# Patient Record
Sex: FEMALE | Race: WHITE | NOT HISPANIC OR LATINO | Employment: UNEMPLOYED | URBAN - METROPOLITAN AREA
[De-identification: names, ages, dates, MRNs, and addresses within clinical notes are randomized per-mention and may not be internally consistent; named-entity substitution may affect disease eponyms.]

---

## 2017-03-31 ENCOUNTER — HOSPITAL ENCOUNTER (EMERGENCY)
Facility: HOSPITAL | Age: 27
Discharge: HOME | End: 2017-03-31
Attending: EMERGENCY MEDICINE

## 2017-03-31 VITALS
HEART RATE: 76 BPM | BODY MASS INDEX: 32.44 KG/M2 | TEMPERATURE: 98 F | OXYGEN SATURATION: 97 % | DIASTOLIC BLOOD PRESSURE: 69 MMHG | WEIGHT: 190 LBS | RESPIRATION RATE: 16 BRPM | HEIGHT: 64 IN | SYSTOLIC BLOOD PRESSURE: 120 MMHG

## 2017-03-31 DIAGNOSIS — N93.9 VAGINAL BLEEDING: Primary | ICD-10-CM

## 2017-03-31 LAB
ABO + RH BLD: NORMAL
B-HCG UR QL: POSITIVE
BACTERIA GENITAL QL WET PREP: ABNORMAL
BASOPHILS # BLD AUTO: 0 K/UL
BASOPHILS NFR BLD: 0.4 %
CLUE CELLS VAG QL WET PREP: ABNORMAL
CTP QC/QA: YES
DIFFERENTIAL METHOD: ABNORMAL
EOSINOPHIL # BLD AUTO: 0.1 K/UL
EOSINOPHIL NFR BLD: 1.1 %
ERYTHROCYTE [DISTWIDTH] IN BLOOD BY AUTOMATED COUNT: 13.1 %
FILAMENT FUNGI VAG WET PREP-#/AREA: ABNORMAL
HCG INTACT+B SERPL-ACNC: NORMAL MIU/ML
HCT VFR BLD AUTO: 40.4 %
HGB BLD-MCNC: 13.8 G/DL
LYMPHOCYTES # BLD AUTO: 1.5 K/UL
LYMPHOCYTES NFR BLD: 13.4 %
MCH RBC QN AUTO: 32.3 PG
MCHC RBC AUTO-ENTMCNC: 34.1 %
MCV RBC AUTO: 95 FL
MONOCYTES # BLD AUTO: 0.5 K/UL
MONOCYTES NFR BLD: 4.3 %
NEUTROPHILS # BLD AUTO: 9 K/UL
NEUTROPHILS NFR BLD: 80.8 %
PLATELET # BLD AUTO: 271 K/UL
PMV BLD AUTO: 8.3 FL
RBC # BLD AUTO: 4.27 M/UL
SPECIMEN SOURCE: ABNORMAL
T VAGINALIS GENITAL QL WET PREP: ABNORMAL
WBC # BLD AUTO: 11.2 K/UL
WBC #/AREA VAG WET PREP: ABNORMAL
YEAST GENITAL QL WET PREP: ABNORMAL

## 2017-03-31 PROCEDURE — 86900 BLOOD TYPING SEROLOGIC ABO: CPT

## 2017-03-31 PROCEDURE — 36415 COLL VENOUS BLD VENIPUNCTURE: CPT

## 2017-03-31 PROCEDURE — 99284 EMERGENCY DEPT VISIT MOD MDM: CPT

## 2017-03-31 PROCEDURE — 84702 CHORIONIC GONADOTROPIN TEST: CPT

## 2017-03-31 PROCEDURE — 86901 BLOOD TYPING SEROLOGIC RH(D): CPT

## 2017-03-31 PROCEDURE — 87210 SMEAR WET MOUNT SALINE/INK: CPT

## 2017-03-31 PROCEDURE — 81025 URINE PREGNANCY TEST: CPT | Performed by: PHYSICIAN ASSISTANT

## 2017-03-31 PROCEDURE — 87591 N.GONORRHOEAE DNA AMP PROB: CPT

## 2017-03-31 PROCEDURE — 85025 COMPLETE CBC W/AUTO DIFF WBC: CPT

## 2017-03-31 NOTE — ED NOTES
"Pt requesting to know, "What we're waiting on?" Pt informed awaiting results of lab tests and urinalysis which has not been provided yet. Pt states, "Well, the doctor said the baby was all right, and I'm tired, and I just want to go home." Pt educated on importance of staying in ED until all test results return. Pt still states she wishes to leave. Pt ambulatory out of ED with SO at this time without distress. States she will return, "If I feel bad later." Pt refused to sign MAGDALENE BENITEZ and PA aware.  "

## 2017-03-31 NOTE — ED NOTES
Urine sample again requested from patient. Pt again states unable to provide urine sample at this time. Pt educated about importance of urine sample. Instructed to alert RN when able to provide sample. Pt verbalized understanding. Pt given PO fluids at this time per request.

## 2017-03-31 NOTE — ED NOTES
Urine sample requested from patient. Pt states unable to provide urine sample at this time. Pt educated about importance of urine sample. Instructed to alert RN when able to provide sample. Pt verbalized understanding.

## 2017-04-01 LAB
C TRACH DNA SPEC QL NAA+PROBE: NOT DETECTED
N GONORRHOEA DNA SPEC QL NAA+PROBE: NOT DETECTED

## 2017-04-01 NOTE — ED PROVIDER NOTES
Encounter Date: 3/31/2017       History     Chief Complaint   Patient presents with    Vaginal Bleeding     7 weeks pregnant     Review of patient's allergies indicates:  No Known Allergies  HPI Comments: Veronica Rothman is a 26 y.o. Female presenting for evaluation of bright red vaginal bleeding and associated lower abdominal cramping, that began this morning.  Patient states she is approximately 7 weeks pregnant.  Her last menstrual cycle was in early February.  She is noticed no abnormal vaginal discharge, aside from the bleeding.  No dysuria or hematuria.  No fever, no chills.  No nausea, vomiting or diarrhea.  She has not seen her OB/GYN for this pregnancy yet.  She is  A0.  She does not know her blood type.  She has not taken any medication for the pain.     The history is provided by the patient.     History reviewed. No pertinent past medical history.  History reviewed. No pertinent surgical history.  History reviewed. No pertinent family history.  Social History   Substance Use Topics    Smoking status: Current Every Day Smoker     Packs/day: 1.00     Types: Cigarettes    Smokeless tobacco: None    Alcohol use No     Review of Systems   Constitutional: Negative for chills and fever.   HENT: Negative for sore throat.    Respiratory: Negative for cough, chest tightness, shortness of breath and wheezing.    Cardiovascular: Negative for chest pain and palpitations.   Gastrointestinal: Negative for abdominal pain, diarrhea, nausea and vomiting.   Genitourinary: Positive for pelvic pain and vaginal bleeding. Negative for dysuria, hematuria and vaginal discharge.   Musculoskeletal: Negative for arthralgias, back pain, joint swelling, myalgias, neck pain and neck stiffness.   Skin: Negative for color change, pallor, rash and wound.   Neurological: Negative for weakness and numbness.   Hematological: Does not bruise/bleed easily.   Psychiatric/Behavioral: The patient is not nervous/anxious.        Physical  Exam   Initial Vitals   BP Pulse Resp Temp SpO2   03/31/17 1028 03/31/17 1028 03/31/17 1028 03/31/17 1028 03/31/17 1028   120/69 76 16 98.1 °F (36.7 °C) 97 %     Physical Exam    Nursing note and vitals reviewed.  Constitutional: She appears well-developed and well-nourished. She is not diaphoretic. No distress.   HENT:   Head: Normocephalic and atraumatic.   Eyes: Conjunctivae are normal.   Cardiovascular: Normal rate, regular rhythm, normal heart sounds and intact distal pulses.   Pulmonary/Chest: Breath sounds normal. No respiratory distress. She has no wheezes. She has no rhonchi. She has no rales.   Abdominal: Soft. She exhibits no distension and no mass. There is no tenderness.   Genitourinary: Pelvic exam was performed with patient supine. There is no rash, tenderness, lesion or injury on the right labia. There is no rash, tenderness, lesion or injury on the left labia. Uterus is tender. Uterus is not enlarged. Cervix exhibits no motion tenderness and no discharge. Right adnexum displays tenderness. Right adnexum displays no mass and no fullness. Left adnexum displays tenderness. Left adnexum displays no mass. There is bleeding in the vagina. No erythema in the vagina. No foreign body in the vagina. No signs of injury around the vagina. No vaginal discharge found.   Genitourinary Comments: Moderately heavy vaginal bleeding noted, with clotting in vaginal vault.  Cervical os is closed.  Tenderness to palpation noted to bilateral adnexa and uterus, without masses.   Musculoskeletal: Normal range of motion. She exhibits no edema or tenderness.   Neurological: She is alert and oriented to person, place, and time. She has normal strength. No sensory deficit.   Skin: Skin is warm and dry. No rash and no abscess noted. No erythema.         ED Course   Procedures  Labs Reviewed   VAGINAL SCREEN - Abnormal; Notable for the following:        Result Value    Bacteria - Vaginal Screen Moderate (*)     All other  components within normal limits   CBC W/ AUTO DIFFERENTIAL - Abnormal; Notable for the following:     MCH 32.3 (*)     MPV 8.3 (*)     Gran # 9.0 (*)     Gran% 80.8 (*)     Lymph% 13.4 (*)     All other components within normal limits   POCT URINE PREGNANCY - Abnormal; Notable for the following:     POC Preg Test, Ur Positive (*)     All other components within normal limits   C. TRACHOMATIS/N. GONORRHOEAE BY AMP DNA   HCG, QUANTITATIVE, PREGNANCY   GROUP & RH             Medical Decision Making:   Differential Diagnosis:   Ectopic pregnancy  Threatened miscarriage  Intrauterine pregnancy  UTI  Vaginal Infection   Clinical Tests:   Lab Tests: Reviewed and Ordered  Radiological Study: Ordered and Reviewed       APC / Resident Notes:   Pelvic ultrasound shows a Single, live, intrauterine fetus with fetal heart rate 169 beats per minute estimated gestational age by ultrasound 7 weeks 6 days plus or -0 week 4 day giving PIERCE of 11/11/2017  Echogenic material within the cervical canal suggesting clot    Quantitative hCG is 100,000.      Patient eloped prior to receiving results of H&H and urinalysis.         Attending Attestation:     Physician Attestation Statement for NP/PA:   I have conducted a face to face encounter with this patient in addition to the NP/PA, due to    Other NP/PA Attestation Additions:    History of Present Illness: Patient has live IUP, she is diagnosed with threatened AB and is to follow up with OBGYN in the next 2-3 days for re-evaluation, cautioned to return to the ER for any worsening or for any further concerns.                     ED Course     Clinical Impression:   There were no encounter diagnoses.          Johana Mtz PA-C  03/31/17 2031       Terence Guthrie MD  03/31/17 7381

## 2020-12-28 ENCOUNTER — HOSPITAL ENCOUNTER (EMERGENCY)
Facility: HOSPITAL | Age: 30
Discharge: HOME | End: 2020-12-29
Attending: EMERGENCY MEDICINE
Payer: MEDICAID

## 2020-12-28 DIAGNOSIS — N61.1 BREAST ABSCESS: Primary | ICD-10-CM

## 2020-12-28 LAB
ALBUMIN SERPL BCP-MCNC: 4.5 G/DL (ref 3.5–5.2)
ALP SERPL-CCNC: 77 U/L (ref 55–135)
ALT SERPL W/O P-5'-P-CCNC: 13 U/L (ref 10–44)
ANION GAP SERPL CALC-SCNC: 8 MMOL/L (ref 8–16)
AST SERPL-CCNC: 18 U/L (ref 10–40)
B-HCG UR QL: NEGATIVE
BASOPHILS # BLD AUTO: 0.03 K/UL (ref 0–0.2)
BASOPHILS NFR BLD: 0.4 % (ref 0–1.9)
BILIRUB SERPL-MCNC: 0.5 MG/DL (ref 0.1–1)
BUN SERPL-MCNC: 13 MG/DL (ref 6–20)
CALCIUM SERPL-MCNC: 9.8 MG/DL (ref 8.7–10.5)
CHLORIDE SERPL-SCNC: 103 MMOL/L (ref 95–110)
CO2 SERPL-SCNC: 27 MMOL/L (ref 23–29)
CREAT SERPL-MCNC: 0.7 MG/DL (ref 0.5–1.4)
CTP QC/QA: YES
DIFFERENTIAL METHOD: ABNORMAL
EOSINOPHIL # BLD AUTO: 0.5 K/UL (ref 0–0.5)
EOSINOPHIL NFR BLD: 5.8 % (ref 0–8)
ERYTHROCYTE [DISTWIDTH] IN BLOOD BY AUTOMATED COUNT: 14 % (ref 11.5–14.5)
EST. GFR  (AFRICAN AMERICAN): >60 ML/MIN/1.73 M^2
EST. GFR  (NON AFRICAN AMERICAN): >60 ML/MIN/1.73 M^2
GLUCOSE SERPL-MCNC: 93 MG/DL (ref 70–110)
HCT VFR BLD AUTO: 44.4 % (ref 37–48.5)
HGB BLD-MCNC: 14.6 G/DL (ref 12–16)
IMM GRANULOCYTES # BLD AUTO: 0.02 K/UL (ref 0–0.04)
IMM GRANULOCYTES NFR BLD AUTO: 0.2 % (ref 0–0.5)
LYMPHOCYTES # BLD AUTO: 3 K/UL (ref 1–4.8)
LYMPHOCYTES NFR BLD: 36.8 % (ref 18–48)
MCH RBC QN AUTO: 29.9 PG (ref 27–31)
MCHC RBC AUTO-ENTMCNC: 32.9 G/DL (ref 32–36)
MCV RBC AUTO: 91 FL (ref 82–98)
MONOCYTES # BLD AUTO: 0.4 K/UL (ref 0.3–1)
MONOCYTES NFR BLD: 5 % (ref 4–15)
NEUTROPHILS # BLD AUTO: 4.2 K/UL (ref 1.8–7.7)
NEUTROPHILS NFR BLD: 51.8 % (ref 38–73)
NRBC BLD-RTO: 0 /100 WBC
PLATELET # BLD AUTO: 376 K/UL (ref 150–350)
PMV BLD AUTO: 9.5 FL (ref 9.2–12.9)
POTASSIUM SERPL-SCNC: 4.1 MMOL/L (ref 3.5–5.1)
PROT SERPL-MCNC: 8.6 G/DL (ref 6–8.4)
RBC # BLD AUTO: 4.89 M/UL (ref 4–5.4)
SODIUM SERPL-SCNC: 138 MMOL/L (ref 136–145)
WBC # BLD AUTO: 8.08 K/UL (ref 3.9–12.7)

## 2020-12-28 PROCEDURE — 96374 THER/PROPH/DIAG INJ IV PUSH: CPT

## 2020-12-28 PROCEDURE — 63600175 PHARM REV CODE 636 W HCPCS: Performed by: STUDENT IN AN ORGANIZED HEALTH CARE EDUCATION/TRAINING PROGRAM

## 2020-12-28 PROCEDURE — 80053 COMPREHEN METABOLIC PANEL: CPT

## 2020-12-28 PROCEDURE — 81025 URINE PREGNANCY TEST: CPT | Performed by: NURSE PRACTITIONER

## 2020-12-28 PROCEDURE — 36415 COLL VENOUS BLD VENIPUNCTURE: CPT

## 2020-12-28 PROCEDURE — 99284 EMERGENCY DEPT VISIT MOD MDM: CPT | Mod: 25

## 2020-12-28 PROCEDURE — 85025 COMPLETE CBC W/AUTO DIFF WBC: CPT

## 2020-12-28 PROCEDURE — 87040 BLOOD CULTURE FOR BACTERIA: CPT | Mod: 59

## 2020-12-28 RX ORDER — KETOROLAC TROMETHAMINE 30 MG/ML
10 INJECTION, SOLUTION INTRAMUSCULAR; INTRAVENOUS
Status: COMPLETED | OUTPATIENT
Start: 2020-12-28 | End: 2020-12-28

## 2020-12-28 RX ORDER — CLINDAMYCIN HYDROCHLORIDE 150 MG/1
450 CAPSULE ORAL
Status: COMPLETED | OUTPATIENT
Start: 2020-12-29 | End: 2020-12-29

## 2020-12-28 RX ORDER — KETOROLAC TROMETHAMINE 30 MG/ML
10 INJECTION, SOLUTION INTRAMUSCULAR; INTRAVENOUS
Status: DISCONTINUED | OUTPATIENT
Start: 2020-12-28 | End: 2020-12-28

## 2020-12-28 RX ADMIN — KETOROLAC TROMETHAMINE 10 MG: 30 INJECTION, SOLUTION INTRAMUSCULAR at 08:12

## 2020-12-29 ENCOUNTER — OFFICE VISIT (OUTPATIENT)
Dept: SURGERY | Facility: CLINIC | Age: 30
End: 2020-12-29
Payer: MEDICAID

## 2020-12-29 VITALS
DIASTOLIC BLOOD PRESSURE: 78 MMHG | HEART RATE: 76 BPM | OXYGEN SATURATION: 95 % | TEMPERATURE: 99 F | BODY MASS INDEX: 36.7 KG/M2 | RESPIRATION RATE: 18 BRPM | HEIGHT: 64 IN | WEIGHT: 215 LBS | SYSTOLIC BLOOD PRESSURE: 124 MMHG

## 2020-12-29 VITALS — HEIGHT: 64 IN | BODY MASS INDEX: 36.7 KG/M2 | WEIGHT: 215 LBS | TEMPERATURE: 97 F

## 2020-12-29 DIAGNOSIS — N61.1 ABSCESS OF BREAST, LEFT: Primary | ICD-10-CM

## 2020-12-29 PROCEDURE — 99203 OFFICE O/P NEW LOW 30 MIN: CPT | Mod: S$GLB,,, | Performed by: SURGERY

## 2020-12-29 PROCEDURE — 25000003 PHARM REV CODE 250: Performed by: STUDENT IN AN ORGANIZED HEALTH CARE EDUCATION/TRAINING PROGRAM

## 2020-12-29 PROCEDURE — 99203 PR OFFICE/OUTPT VISIT, NEW, LEVL III, 30-44 MIN: ICD-10-PCS | Mod: S$GLB,,, | Performed by: SURGERY

## 2020-12-29 RX ORDER — CLINDAMYCIN HYDROCHLORIDE 150 MG/1
450 CAPSULE ORAL EVERY 8 HOURS
Qty: 126 CAPSULE | Refills: 0 | Status: SHIPPED | OUTPATIENT
Start: 2020-12-29 | End: 2021-01-12

## 2020-12-29 RX ADMIN — CLINDAMYCIN HYDROCHLORIDE 450 MG: 150 CAPSULE ORAL at 12:12

## 2020-12-29 NOTE — PROGRESS NOTES
Subjective:       Patient ID: Veronica Rothman is a 30 y.o. female.    Chief Complaint: Other (Eval LT Breast Abscess)      HPI:  30 year old female referred to the office with a small left breast retroareolar abscess.  She presented to the emergency department overnight and was started on clindamycin.  She was referred to the office this morning for evaluation.  Patient reports no fevers or chills.  She states that she does have pain at the nipple especially when any pressure applied to it.  There is no active drainage.  She has never had abscess in the past.  She has never had any breast surgery in the past.  She is not currently breast feeding or lactating.  Ultrasound results listed below.    There is a likely breast abscess in the retroareolar region measuring 15 x 13 x 13 mm. Hypoechoic area with hypervascular flow surrounding the region. This area needs follow-up after treatment for presumed abscess to determine that this masslike area resolves since inflammatory breast cancer can have this appearance. Referral to breast surgery is also recommended.       History reviewed. No pertinent past medical history.  Past Surgical History:   Procedure Laterality Date     SECTION       Review of patient's allergies indicates:  No Known Allergies  Medication List with Changes/Refills   Current Medications    CLINDAMYCIN (CLEOCIN) 150 MG CAPSULE    Take 3 capsules (450 mg total) by mouth every 8 (eight) hours. for 14 days     History reviewed. No pertinent family history.  Social History     Socioeconomic History    Marital status: Single     Spouse name: Not on file    Number of children: Not on file    Years of education: Not on file    Highest education level: Not on file   Occupational History    Not on file   Social Needs    Financial resource strain: Not on file    Food insecurity     Worry: Not on file     Inability: Not on file    Transportation needs     Medical: Not on file     Non-medical: Not  on file   Tobacco Use    Smoking status: Current Every Day Smoker     Packs/day: 1.00     Types: Cigarettes    Smokeless tobacco: Never Used   Substance and Sexual Activity    Alcohol use: No    Drug use: Not on file    Sexual activity: Not on file   Lifestyle    Physical activity     Days per week: Not on file     Minutes per session: Not on file    Stress: Not on file   Relationships    Social connections     Talks on phone: Not on file     Gets together: Not on file     Attends [employer] service: Not on file     Active member of club or organization: Not on file     Attends meetings of clubs or organizations: Not on file     Relationship status: Not on file   Other Topics Concern    Not on file   Social History Narrative    Not on file         Review of Systems   Constitutional: Negative for appetite change, chills, fever and unexpected weight change.   HENT: Negative for hearing loss, rhinorrhea, sore throat and voice change.    Eyes: Negative for photophobia and visual disturbance.   Respiratory: Negative for cough, choking and shortness of breath.    Cardiovascular: Negative for chest pain, palpitations and leg swelling.   Gastrointestinal: Negative for abdominal pain, blood in stool, constipation, diarrhea, nausea and vomiting.   Endocrine: Negative for cold intolerance, heat intolerance and polyphagia.   Genitourinary: Negative for dysuria.   Musculoskeletal: Negative for arthralgias and back pain.   Skin: Negative for color change.   Neurological: Negative for dizziness, seizures, syncope and headaches.   Hematological: Negative for adenopathy. Does not bruise/bleed easily.       Objective:      Physical Exam  Constitutional:       General: She is not in acute distress.     Appearance: Normal appearance. She is well-developed. She is not toxic-appearing.   HENT:      Head: Normocephalic and atraumatic. No abrasion or laceration.      Right Ear: External ear normal.      Left Ear: External ear  normal.      Nose: Nose normal.   Eyes:      Pupils: Pupils are equal, round, and reactive to light.   Neck:      Musculoskeletal: Neck supple. Normal range of motion.      Trachea: Trachea and phonation normal. No tracheal deviation.   Cardiovascular:      Rate and Rhythm: Normal rate and regular rhythm.   Pulmonary:      Effort: Pulmonary effort is normal. No tachypnea, accessory muscle usage or respiratory distress.   Chest:          Comments: Firm and tender areolar abscess just above the nipple.  No active drainage.  No surrounding cellulitis on the breast itself.  Abdominal:      General: There is no distension.      Palpations: Abdomen is soft.      Tenderness: There is no abdominal tenderness. There is no guarding or rebound.      Hernia: No hernia is present.   Lymphadenopathy:      Cervical: No cervical adenopathy.   Skin:     General: Skin is warm.   Neurological:      Mental Status: She is alert and oriented to person, place, and time.      Coordination: Coordination normal.      Gait: Gait normal.   Psychiatric:         Speech: Speech normal.         Behavior: Behavior normal.         Assessment/Plan:     Veronica was seen today for other.    Diagnoses and all orders for this visit:    Abscess of breast, left      Patient has small retroareolar/nipple abscess.  She was started on clindamycin 450mg TID for the next 14 days.  We discussed pros and cons of incision and drainage.  Patient was fairly anxious about drainage.  Abscess is fairly small.  I think would be reasonable to observe for the next couple days and come back for I and D if things do not improve or get worse.  Will follow up with her on Thursday.

## 2020-12-29 NOTE — DISCHARGE INSTRUCTIONS
Contact Dr. Tavares's clinic around 830 AM to schedule your appointment for tomorrow. The number is: [phone].     Take the antibiotic as prescribed, every 8 hours, for 14 days. Return if the redness worsens or if you develop fever or any other concerning symptoms.

## 2020-12-29 NOTE — ED PROVIDER NOTES
Encounter Date: 12/28/2020       History     Chief Complaint   Patient presents with    Breast Problem     redness and swelling     HPI     31 yo F presenting with left breast pain and redness. Symptoms have been gradually worsening for the past 3 days. No hx of similar symptoms. No nipple discharge. No fever or chills. Unaware of palliating measures and has not taken anything for pain. Not breastfeeding. Denies any trauma to the breast. No hx of abscesses.       Review of patient's allergies indicates:  No Known Allergies  No past medical history on file.  No past surgical history on file.  No family history on file.  Social History     Tobacco Use    Smoking status: Current Every Day Smoker     Packs/day: 1.00     Types: Cigarettes   Substance Use Topics    Alcohol use: No    Drug use: Not on file     Review of Systems   Constitutional: Negative for fever.   HENT: Negative for congestion and sore throat.    Eyes: Negative for visual disturbance.   Respiratory: Negative for shortness of breath.    Cardiovascular: Negative for chest pain.   Gastrointestinal: Negative for abdominal pain, nausea and vomiting.   Genitourinary: Negative for dysuria.   Musculoskeletal: Negative for back pain.   Skin: Negative for rash.        Breast pain with redness   Neurological: Negative for weakness and headaches.   Hematological: Does not bruise/bleed easily.   Psychiatric/Behavioral: Negative for agitation and behavioral problems.       Physical Exam     Initial Vitals [12/28/20 1815]   BP Pulse Resp Temp SpO2   (!) 162/82 101 18 98.5 °F (36.9 °C) (!) 92 %      MAP       --         Physical Exam    Nursing note and vitals reviewed.  Constitutional: She appears well-developed and well-nourished.   HENT:   Head: Normocephalic and atraumatic.   Nose: Nose normal.   Eyes: Conjunctivae are normal.   Neck: Normal range of motion. Neck supple.   Cardiovascular: Normal rate and regular rhythm.   Pulmonary/Chest: Breath sounds normal.  No respiratory distress.   Abdominal: Soft. There is no abdominal tenderness.   Musculoskeletal: Normal range of motion.   Neurological: She is alert.   Skin: Skin is warm. Capillary refill takes less than 2 seconds.   Left breast examined: 1cm circular area of induration to 12 o'clock position above nipple, small surrounding area of erythema with linear area tracking in the 9 o'clock position, no nipple discharge. No nodules palpated. No axillary lymphadenopathy    Psychiatric: She has a normal mood and affect.         ED Course   Procedures  Labs Reviewed   CBC W/ AUTO DIFFERENTIAL - Abnormal; Notable for the following components:       Result Value    Platelets 376 (*)     All other components within normal limits   COMPREHENSIVE METABOLIC PANEL - Abnormal; Notable for the following components:    Total Protein 8.6 (*)     All other components within normal limits   CULTURE, BLOOD   CULTURE, BLOOD   POCT URINE PREGNANCY          Imaging Results          US Breast Left Limited (Final result)  Result time 12/28/20 23:20:00   Procedure changed from US Breast Left Complete     Final result by Ronald Turner MD (12/28/20 23:20:00)                 Narrative:    Examination:  Left breast ultrasound.    Clinical History and Indication:    Possible abscess. Redness and swelling in the retroareolar region.    Comparison:    None    Technique:    Real-time grayscale ultrasound of the breast in the area of interest.    Findings:    There is a likely breast abscess in the retroareolar region measuring 15 x 13 x 13 mm. Hypoechoic area with hypervascular flow surrounding the region. This area needs follow-up after treatment for presumed abscess to determine that this masslike area resolves since inflammatory breast cancer can have this appearance. Referral to breast surgery is also recommended.    Impression:    Probable abscess in the retroareolar region but not a definite diagnosis. Hypervascular changes around a hypoechoic  area with some through transmission. Abscess versus hypervascular mass and follow-up images are needed to assure this resolves with treatment.    Electronically signed by:  Ronald Turner MD  12/29/2020 12:02 AM [facility] Workstation: [id number]                                MDM:  29 yo non-lactating F presenting with left breast pain and redness x 3 days. Exam as described above. Ddx includes but not limited to breast cellulitis vs abscess vs neoplasm. Will obtain Breast U/S for further evaluation.  Final disposition pending work-up.     Patricia Garcia, PGY-4  [employer] EM            Attending Attestation:   Physician Attestation Statement for Resident:  As the supervising MD   Physician Attestation Statement: I have personally seen and examined this patient.   I agree with the above history. -:   As the supervising MD I agree with the above PE.    As the supervising MD I agree with the above treatment, course, plan, and disposition.            Update:  -No leukocytosis. Overall non-toxic appearing, afebrile and hemodynamically stable - low suspicion for systemic infection.  -U/S performed - notable for 1.3cm x 1.7 cm x 1.9 cm loculated area, consistent with abscess. Linear area of erythema tracking up breast aligns with underlying vessel but no associated thrombus.   -Consulted Dr. Tavares with general surgery. Plan for out-patient follow-up in clinic tomorrow for needle aspiration. Pt comfortable with plan. Will also treat with Clindamycin 450mg TID x 14 days. Gave initial dose here.  Discussed discharge instructions impression measures.  Strong return precautions given.  Patient in agreement plan.                      Clinical Impression:     ICD-10-CM ICD-9-CM   1. Breast abscess  N61.1 611.0                          ED Disposition Condition    Discharge Stable        ED Prescriptions     Medication Sig Dispense Start Date End Date Auth. Provider    clindamycin (CLEOCIN) 150 MG capsule Take 3 capsules (450 mg total) by mouth  every 8 (eight) hours. for 14 days 126 capsule 12/29/2020 1/12/2021 Zee Garcia MD        Follow-up Information     Follow up With Specialties Details Why Contact Info Additional Information    Moiz Tavares III, MD General Surgery, Surgery Go on 12/29/2020 For reassessment [address]  [address]  [place] [address]  [phone]       [facility] Emergency Medicine Go to  As needed, If symptoms worsen [address] [address]  [phone] 1st floor                                       Zee Garcia MD  Resident  12/29/20 0005       Terence Guthrie MD  12/29/20 0006

## 2020-12-29 NOTE — FIRST PROVIDER EVALUATION
Medical screening exam completed.  I have conducted a focused provider triage encounter, findings are as follows:    Brief history of present illness:  Left breast pain  With redness and streaking very tender mass to left breast    There were no vitals filed for this visit.    Pertinent physical exam:  Red streak to left breast  Tenderness area of nipple     Brief workup plan:  IV antibiotics and labs     Preliminary workup initiated; this workup will be continued and followed by the physician or advanced practice provider that is assigned to the patient when roomed.

## 2020-12-29 NOTE — ED NOTES
Redness noted to left breast- around nipple & above in a streak. No known trauma to the area. Started Pa[place] morning.

## 2020-12-29 NOTE — LETTER
December 30, 2020      [facility] - ED  [address]  [address]  [place] [address]           [facility]-General Surgery  [address]  [place] [address]  Phone: [phone]  Fax: [phone]          Patient: Veronica Rothman   MR Number: [id number]   YOB: 1990   Date of Visit: 12/29/2020       Dear [facility] - Ed:    Thank you for referring Veronica Rothman to me for evaluation. Attached you will find relevant portions of my assessment and plan of care.    If you have questions, please do not hesitate to call me. I look forward to following Veronica Rothman along with you.    Sincerely,    Moiz Tavares III, MD    Enclosure  CC:  No Recipients    If you would like to receive this communication electronically, please contact [email or web address] or [phone] to request more information on [employer] access.    For providers and/or their staff who would like to refer a patient to [facility], please contact us through our one-stop-shop provider referral line, [facility], at [phone].    If you feel you have received this communication in error or would no longer like to receive these types of communications, please e-mail [email or web address]

## 2021-01-02 LAB
BACTERIA BLD CULT: NORMAL
BACTERIA BLD CULT: NORMAL

## 2024-01-25 ENCOUNTER — HOSPITAL ENCOUNTER (EMERGENCY)
Facility: HOSPITAL | Age: 34
Discharge: HOME | End: 2024-01-25
Attending: STUDENT IN AN ORGANIZED HEALTH CARE EDUCATION/TRAINING PROGRAM
Payer: MEDICAID

## 2024-01-25 VITALS
TEMPERATURE: 98 F | RESPIRATION RATE: 18 BRPM | OXYGEN SATURATION: 92 % | HEIGHT: 64 IN | HEART RATE: 79 BPM | BODY MASS INDEX: 40.97 KG/M2 | SYSTOLIC BLOOD PRESSURE: 121 MMHG | WEIGHT: 240 LBS | DIASTOLIC BLOOD PRESSURE: 69 MMHG

## 2024-01-25 DIAGNOSIS — M54.50 ACUTE RIGHT-SIDED LOW BACK PAIN WITHOUT SCIATICA: Primary | ICD-10-CM

## 2024-01-25 LAB
B-HCG UR QL: NEGATIVE
BACTERIA #/AREA URNS HPF: ABNORMAL /HPF
BILIRUB UR QL STRIP: NEGATIVE
CLARITY UR: ABNORMAL
COLOR UR: YELLOW
CTP QC/QA: YES
GLUCOSE UR QL STRIP: NEGATIVE
HGB UR QL STRIP: NEGATIVE
HYALINE CASTS #/AREA URNS LPF: 4 /LPF
KETONES UR QL STRIP: NEGATIVE
LEUKOCYTE ESTERASE UR QL STRIP: ABNORMAL
MICROSCOPIC COMMENT: ABNORMAL
NITRITE UR QL STRIP: NEGATIVE
PH UR STRIP: 6 [PH] (ref 5–8)
PROT UR QL STRIP: ABNORMAL
RBC #/AREA URNS HPF: 5 /HPF (ref 0–4)
SP GR UR STRIP: >1.03 (ref 1–1.03)
SQUAMOUS #/AREA URNS HPF: 20 /HPF
URN SPEC COLLECT METH UR: ABNORMAL
UROBILINOGEN UR STRIP-ACNC: ABNORMAL EU/DL
WBC #/AREA URNS HPF: 15 /HPF (ref 0–5)
YEAST URNS QL MICRO: ABNORMAL

## 2024-01-25 PROCEDURE — 81000 URINALYSIS NONAUTO W/SCOPE: CPT | Performed by: NURSE PRACTITIONER

## 2024-01-25 PROCEDURE — 87086 URINE CULTURE/COLONY COUNT: CPT | Performed by: NURSE PRACTITIONER

## 2024-01-25 PROCEDURE — 81025 URINE PREGNANCY TEST: CPT | Performed by: NURSE PRACTITIONER

## 2024-01-25 PROCEDURE — 25000003 PHARM REV CODE 250: Performed by: STUDENT IN AN ORGANIZED HEALTH CARE EDUCATION/TRAINING PROGRAM

## 2024-01-25 PROCEDURE — 99284 EMERGENCY DEPT VISIT MOD MDM: CPT

## 2024-01-25 RX ORDER — NAPROXEN 500 MG/1
500 TABLET ORAL 2 TIMES DAILY WITH MEALS
Qty: 28 TABLET | Refills: 0 | Status: SHIPPED | OUTPATIENT
Start: 2024-01-25 | End: 2024-02-08

## 2024-01-25 RX ORDER — LIDOCAINE 50 MG/G
1 PATCH TOPICAL DAILY
Qty: 7 PATCH | Refills: 0 | Status: SHIPPED | OUTPATIENT
Start: 2024-01-25

## 2024-01-25 RX ORDER — METHOCARBAMOL 750 MG/1
1500 TABLET, FILM COATED ORAL
Status: COMPLETED | OUTPATIENT
Start: 2024-01-25 | End: 2024-01-25

## 2024-01-25 RX ORDER — LIDOCAINE 50 MG/G
1 PATCH TOPICAL
Status: DISCONTINUED | OUTPATIENT
Start: 2024-01-25 | End: 2024-01-25 | Stop reason: HOSPADM

## 2024-01-25 RX ORDER — METHOCARBAMOL 750 MG/1
1500 TABLET, FILM COATED ORAL 3 TIMES DAILY PRN
Qty: 30 TABLET | Refills: 0 | Status: SHIPPED | OUTPATIENT
Start: 2024-01-25 | End: 2024-01-30

## 2024-01-25 RX ADMIN — METHOCARBAMOL TABLETS 1500 MG: 750 TABLET, COATED ORAL at 05:01

## 2024-01-25 RX ADMIN — LIDOCAINE 1 PATCH: 50 PATCH CUTANEOUS at 05:01

## 2024-01-25 NOTE — FIRST PROVIDER EVALUATION
Emergency Department TeleTriage Encounter Note      CHIEF COMPLAINT    Chief Complaint   Patient presents with    Back Pain     Patient states she was lifting a 60lb bag of chicken food and felt pain yesterday and woke up this am in more pain and it has gotten progressively worse        VITAL SIGNS   Initial Vitals   BP Pulse Resp Temp SpO2   01/25/24 1650 01/25/24 1650 01/25/24 1650 01/25/24 1653 01/25/24 1650   122/83 89 20 98.4 °F (36.9 °C) (!) 91 %      MAP       --                   ALLERGIES    Review of patient's allergies indicates:  No Known Allergies    PROVIDER TRIAGE NOTE  Verbal consent for the teletriage evaluation was given by the patient at the start of the evaluation.  All efforts will be made to maintain patient's privacy during the evaluation.      This is a teletriage evaluation of a 33 y.o. female presenting to the ED with c/o picking up a heavy bag and thinks pulled a muscle last night.  Pain worse this AM and worse with movement.  Had Ibuprofen and tylenol about 45 minutes PTA. Patient denies CP and SOB.  Reports a H/O of well controlled asthma.  Limited physical exam via telehealth: The patient is awake, alert, answering questions appropriately and is not in respiratory distress.  As the Teletriage provider, I performed an initial assessment and ordered appropriate labs and imaging studies, if any, to facilitate the patient's care once placed in the ED. Once a room is available, care and a full evaluation will be completed by an alternate ED provider.  Any additional orders and the final disposition will be determined by that provider.  All imaging and labs will not be followed-up by the Teletriage Team, including myself.          ORDERS  Labs Reviewed   URINALYSIS, REFLEX TO URINE CULTURE   POCT URINE PREGNANCY       ED Orders (720h ago, onward)      Start Ordered     Status Ordering Provider    01/25/24 1656 01/25/24 1655  POCT urine pregnancy  Once         Ordered KRISTIAN VILLANUEVA     01/25/24 1656 01/25/24 1655  Urinalysis, Reflex to Urine Culture Urine, Clean Catch  STAT         Ordered KRISTIAN VILLANUEVA A              Virtual Visit Note: The provider triage portion of this emergency department evaluation and documentation was performed via [employer], a HIPAA-compliant telemedicine application, in concert with a tele-presenter in the room. A face to face patient evaluation with one of my colleagues will occur once the patient is placed in an emergency department room.      DISCLAIMER: This note was prepared with [employer] voice recognition transcription software. Garbled syntax, mangled pronouns, and other bizarre constructions may be attributed to that software system.

## 2024-01-25 NOTE — ED PROVIDER NOTES
Encounter Date: 2024       History     Chief Complaint   Patient presents with    Back Pain     Patient states she was lifting a 60lb bag of chicken food and felt pain yesterday and woke up this am in more pain and it has gotten progressively worse      33 y.o. female with no significant past medical history presents for acute right-sided low back pain.  Patient reports yesterday she was lifting a 60 lb bag of chicken food and felt immediate pain though mild at the time.  She woke up this morning with worsening pain.  The pain is nonradiating.  She is still able to ambulate and denies any lower extremity weakness, difficulty urinating, abdominal pain, dysuria, vaginal bleeding      The history is provided by the patient and medical records.     Review of patient's allergies indicates:  No Known Allergies  No past medical history on file.  Past Surgical History:   Procedure Laterality Date     SECTION       No family history on file.  Social History     Tobacco Use    Smoking status: Every Day     Current packs/day: 1.00     Types: Cigarettes    Smokeless tobacco: Never   Substance Use Topics    Alcohol use: No     Review of Systems   Reason unable to perform ROS: See HPI for relevant ROS.       Physical Exam     Initial Vitals   BP Pulse Resp Temp SpO2   24 1650 24 1650 24 1650 24 1653 24 1650   122/83 89 20 98.4 °F (36.9 °C) (!) 91 %      MAP       --                Physical Exam    Nursing note and vitals reviewed.  Constitutional:   Alert, normal work of breathing, no acute distress   Eyes: Conjunctivae are normal. No scleral icterus.   Cardiovascular:  Normal rate, regular rhythm and intact distal pulses.           Pulmonary/Chest: No stridor. No respiratory distress.   Abdominal: Abdomen is soft. She exhibits no distension.   Musculoskeletal:      Comments: No tenderness, step-offs, deformities, or tenderness to the C, T, or L-spine  Right-sided lumbar paraspinal  tenderness  Normal range of motion of all other extremities without pain  No bony tenderness or deformity of the trunk or extremities     Neurological: She is alert. She has normal strength. No sensory deficit.   Skin: Skin is warm and dry.         ED Course   Procedures  Labs Reviewed   URINALYSIS, REFLEX TO URINE CULTURE - Abnormal; Notable for the following components:       Result Value    Appearance, UA Hazy (*)     Specific Gravity, UA >1.030 (*)     Protein, UA 1+ (*)     Urobilinogen, UA 2.0-3.0 (*)     Leukocytes, UA 3+ (*)     All other components within normal limits    Narrative:     Specimen Source->Urine   URINALYSIS MICROSCOPIC - Abnormal; Notable for the following components:    RBC, UA 5 (*)     WBC, UA 15 (*)     Hyaline Casts, UA 4 (*)     All other components within normal limits    Narrative:     Specimen Source->Urine   CULTURE, URINE   POCT URINE PREGNANCY          Imaging Results    None          Medications   LIDOcaine 5 % patch 1 patch ( Transdermal Canceled Entry 1/25/24 1845)   methocarbamoL tablet 1,500 mg (1,500 mg Oral Given 1/25/24 1740)     Medical Decision Making  33 y.o. female with no significant past medical history presents for acute right-sided low back pain.   Patient with acute low back pain, most likely musculoskeletal in nature based on history and exam.  Differentials include muscle strain, radiculopathy, ligamentous injury, less likely vertebral fracture or spinal cord compression  Exam shows soft tissue tenderness in the right lumbar paraspinal area  Patient well-appearing, ambulatory, no focal neurological deficits on exam, no groin numbness, no difficulty urinating, inconsistent with cauda equina syndrome or spinal cord compression/epidural lesion  No urinary or pelvic symptoms, pain onset was after acute injury, doubt kidney stone or pyelonephritis.  UA shows some WBCs though contaminated sample.  Discussed with the patient who again denies any urinary symptoms.   Patient treated symptomatically, close follow-up instructions were given including return precautions and signs of infection    Amount and/or Complexity of Data Reviewed  External Data Reviewed: notes.    Risk  Prescription drug management.                                      Clinical Impression:  Final diagnoses:  [M54.50] Acute right-sided low back pain without sciatica (Primary)          ED Disposition Condition    Discharge Stable          ED Prescriptions       Medication Sig Dispense Start Date End Date Auth. Provider    naproxen (NAPROSYN) 500 MG tablet Take 1 tablet (500 mg total) by mouth 2 (two) times daily with meals. for 14 days 28 tablet 1/25/2024 2/8/2024 Dominick Hernandez MD    methocarbamoL (ROBAXIN) 750 MG Tab Take 2 tablets (1,500 mg total) by mouth 3 (three) times daily as needed (Pain). 30 tablet 1/25/2024 1/30/2024 Dominick Hernandez MD    LIDOcaine (LIDODERM) 5 % Place 1 patch onto the skin once daily. Remove & Discard patch within 12 hours or as directed by MD 7 patch 1/25/2024 -- Dominick Hernandez MD          Follow-up Information       Follow up With Specialties Details Why Contact Info Additional Information    Your primary care team  Schedule an appointment as soon as possible for a visit        [facility] Emergency Medicine  As needed, Leg weakness, difficulty urinating, groin numbness [address] [place] [address] [address] [place] [id number] 1st floor             Dominick Hernandez MD  01/25/24 6714

## 2024-01-25 NOTE — DISCHARGE INSTRUCTIONS
Take the prescribed medications as needed for your pain    In addition, you can take  Tylenol = Acetaminophen every 6hrs as needed    You can apply hot or cold packs as needed to the painful area, use them for 15 minutes at a time with breaks in between    Do not take ibuprofen, naproxen, advil, or aleve every day for more than 2-3 weeks without following up with your primary care provider, as they can cause stomach pain and other complications if used every day over long periods of time

## 2024-01-28 LAB — BACTERIA UR CULT: NO GROWTH
